# Patient Record
Sex: FEMALE | Race: BLACK OR AFRICAN AMERICAN | Employment: FULL TIME | URBAN - METROPOLITAN AREA
[De-identification: names, ages, dates, MRNs, and addresses within clinical notes are randomized per-mention and may not be internally consistent; named-entity substitution may affect disease eponyms.]

---

## 2019-02-26 ENCOUNTER — OFFICE VISIT (OUTPATIENT)
Dept: FAMILY MEDICINE CLINIC | Age: 49
End: 2019-02-26

## 2019-02-26 VITALS
BODY MASS INDEX: 23.49 KG/M2 | SYSTOLIC BLOOD PRESSURE: 102 MMHG | WEIGHT: 141 LBS | DIASTOLIC BLOOD PRESSURE: 62 MMHG | HEIGHT: 65 IN | TEMPERATURE: 98.5 F | OXYGEN SATURATION: 99 % | RESPIRATION RATE: 16 BRPM | HEART RATE: 71 BPM

## 2019-02-26 DIAGNOSIS — N93.9 ABNORMAL UTERINE BLEEDING: Primary | ICD-10-CM

## 2019-02-26 DIAGNOSIS — Z83.3 FAMILY HISTORY OF DIABETES MELLITUS: ICD-10-CM

## 2019-02-26 DIAGNOSIS — R52 ACHES: ICD-10-CM

## 2019-02-26 DIAGNOSIS — Z76.89 ESTABLISHING CARE WITH NEW DOCTOR, ENCOUNTER FOR: ICD-10-CM

## 2019-02-26 DIAGNOSIS — G89.29 CHRONIC PAIN OF BOTH KNEES: ICD-10-CM

## 2019-02-26 DIAGNOSIS — N92.6 ABNORMAL MENSES: ICD-10-CM

## 2019-02-26 DIAGNOSIS — Z29.9 PREVENTIVE MEASURE: ICD-10-CM

## 2019-02-26 DIAGNOSIS — R45.89 FEELING DOWN: ICD-10-CM

## 2019-02-26 DIAGNOSIS — M25.562 CHRONIC PAIN OF BOTH KNEES: ICD-10-CM

## 2019-02-26 DIAGNOSIS — J32.9 RECURRENT SINUSITIS: ICD-10-CM

## 2019-02-26 DIAGNOSIS — M25.561 CHRONIC PAIN OF BOTH KNEES: ICD-10-CM

## 2019-02-26 LAB
BILIRUB UR QL STRIP: NEGATIVE
GLUCOSE UR-MCNC: NEGATIVE MG/DL
HCG URINE, QL. (POC): NEGATIVE
KETONES P FAST UR STRIP-MCNC: NEGATIVE MG/DL
PH UR STRIP: 7 [PH] (ref 4.6–8)
PROT UR QL STRIP: NEGATIVE
SP GR UR STRIP: 1.02 (ref 1–1.03)
UA UROBILINOGEN AMB POC: NORMAL (ref 0.2–1)
URINALYSIS CLARITY POC: CLEAR
URINALYSIS COLOR POC: YELLOW
URINE BLOOD POC: NORMAL
URINE LEUKOCYTES POC: NEGATIVE
URINE NITRITES POC: NEGATIVE
VALID INTERNAL CONTROL?: YES

## 2019-02-26 NOTE — PROGRESS NOTES
Chief Complaint Patient presents with  Labs  
  requesting lab work Blood pressure 102/62, pulse 71, temperature 98.5 °F (36.9 °C), temperature source Oral, resp. rate 16, height 5' 4.76\" (1.645 m), weight 141 lb (64 kg), last menstrual period 02/15/2019, SpO2 99 %. 1. Have you been to the ER, urgent care clinic since your last visit? Hospitalized since your last visit? No 
 
2. Have you seen or consulted any other health care providers outside of the [address] [place] [address] since your last visit? Include any pap smears or colon screening.  No

## 2019-02-26 NOTE — PROGRESS NOTES
HPI  
 
Chief Complaint Patient presents with  Labs  
  requesting lab work She is a 53 y.o. female who presents for labs, establishing care. Establishing Care PMH - uterine fibroids, leiomyoma, ovarian cysts Past Fam Hx - Mom has DM Aches/ Pains - states this happens occasionally, denies trauma 
- started new job recently, is sitting a long and gets pain in her knees 
- states pain improves with exercises, states she has not been able to exercise as much since moving 
- has bought a new cushion for chair at work Feeling down 
- states she feels down in winter time 
- started a new job which is a great source of stress 
- moved to a new neighborhood which she doesn't particularly like 
- states this has been ongoing for years Heavy Menses 
- hx of uterine fibroids 
- has not seen GYN/ had a PAP in years 
- not using birth control or any family planning methods - Mom went into menopause in late 62s 
-  x2 
- some issues with stress incontinence Refuses Flu, Tdap Review of Systems Musculoskeletal: Positive for arthralgias. Psychiatric/Behavioral: Positive for dysphoric mood. Negative for suicidal ideas. Reviewed PmHx, RxHx, FmHx, SocHx, AllgHx and updated and dated in the chart. Physical Exam: 
Visit Vitals /62 Pulse 71 Temp 98.5 °F (36.9 °C) (Oral) Resp 16 Ht 5' 4.76\" (1.645 m) Wt 141 lb (64 kg) LMP 02/15/2019 SpO2 99% BMI 23.63 kg/m² Physical Exam  
Constitutional: She appears well-developed and well-nourished. No distress. Cardiovascular: Normal rate, regular rhythm and normal heart sounds. Exam reveals no gallop and no friction rub. No murmur heard. Pulmonary/Chest: Effort normal and breath sounds normal. No stridor. No respiratory distress. She has no wheezes. She has no rales. Abdominal: Soft. She exhibits no distension and no mass. There is no tenderness. There is no rebound and no guarding. Fundal height below umbilicus Skin: Skin is warm and dry. She is not diaphoretic. Psychiatric: She has a normal mood and affect. Her behavior is normal.  
Nursing note and vitals reviewed. Refused PHQ9/ GAD7 UPT neg 
UA trace blood, no LE/ nitrites/ protein No results found for this or any previous visit (from the past 12 hour(s)). Assessment / Plan Establishing Care - Patient needs to return for full well woman exam to discuss preventive care, PAP Abnormal menses - UPT neg 
- CBC, PTT, PT/ INR, TSH 
- Transvaginal/ transabdominal US 
- Recommend endometrial bx, recommend patient get US as soon as possible to rule out other cases including fibroids that may need referral to [address] [place] [address] returning for PAP as soon as possible Recurrent Sinusitis - Patient request referral to ENT to follow up, states she has seen them previously Preventive Labs/ Family Hx of DM 
- HgA1c, lipid panel, CBC, CMP 
- Mammo needed BL Knee Pain - Patient states pain is directly related to sedentary lifestyle - States pain feels better when she walks around - Recommend new chair/ seat cushion - Set phone hourly and walk during work for 5-10 min at a time if possible - RTC if pain does not improve/ worsen Feeling Down - Refused PHQ9/ GAD7 and to discuss much further including counseling/ medications, patient states she does not want to be \"labeled\" - Vitamin D, TSH Follow up as soon as possible for PAP I have discussed the diagnosis with the patient and the intended plan as seen in the above orders. The patient has received an after-visit summary and questions were answered concerning future plans. I have discussed medication side effects and warnings with the patient as well. Patient discussed with Dr. Lauren Flynn (Attending) Marden Hatchet, DO Family Medicine Resident

## 2019-02-26 NOTE — PATIENT INSTRUCTIONS
If you have not received a phone call within 24 hours regarding scheduling your diagnostic imaging please call central scheduling at [phone]. [facility] Dairl MD Jorje and Samir Villarreal MD 
 
[place] Office: 
[address] [place], [address] 
[phone] [place] Office: 
[address] [place], [address] [facility] 
[phone] 
  
Abnormal Uterine Bleeding: Care Instructions Your Care Instructions Abnormal uterine bleeding (AUB) is irregular bleeding from the uterus that is longer or heavier than usual or does not occur at your regular time. Sometimes it is caused by changes in hormone levels. It can also be caused by growths in the uterus, such as fibroids or polyps. Sometimes a cause cannot be found. You may have heavy bleeding when you are not expecting your period. Your doctor may suggest a pregnancy test, if you think you are pregnant. Follow-up care is a key part of your treatment and safety. Be sure to make and go to all appointments, and call your doctor if you are having problems. It's also a good idea to know your test results and keep a list of the medicines you take. How can you care for yourself at home? · Be safe with medicines. Take pain medicines exactly as directed. ? If the doctor gave you a prescription medicine for pain, take it as prescribed. ? If you are not taking a prescription pain medicine, ask your doctor if you can take an over-the-counter medicine. · You may be low in iron because of blood loss. Eat a balanced diet that is high in iron and vitamin C. Foods rich in iron include red meat, shellfish, eggs, beans, and leafy green vegetables. Talk to your doctor about whether you need to take iron pills or a multivitamin. When should you call for help? Call 911 anytime you think you may need emergency care. For example, call if: 
  · You passed out (lost consciousness).  Call your doctor now or seek immediate medical care if: 
  · You have new or worse belly or pelvic pain.  
  · You have severe vaginal bleeding.  
  · You feel dizzy or lightheaded, or you feel like you may faint.  
 Watch closely for changes in your health, and be sure to contact your doctor if: 
  · You think you may be pregnant.  
  · Your bleeding gets worse.  
  · You do not get better as expected. Where can you learn more? Go to http://ирина-neida.info/. Enter S180 in the search box to learn more about \"Abnormal Uterine Bleeding: Care Instructions. \" Current as of: May 14, 2018 Content Version: 11.9 © 1976-7337 [employer], [employer]. Care instructions adapted under license by [employer] (which disclaims liability or warranty for this information).  If you have questions about a medical condition or this instruction, always ask your healthcare professional. [address] any warranty or liability for your use of this information.

## 2019-02-26 NOTE — PROGRESS NOTES
53year old female here to re-establish care No PAP smear since 2014 Hx uterine fibroids Has stress incontinence at times Will do labs today and followup for PAP smear I reviewed with the resident the medical history and the resident's findings on the physical examination. I discussed with the resident the patient's diagnosis and concur with the plan.

## 2019-02-27 LAB
25(OH)D3+25(OH)D2 SERPL-MCNC: 18.3 NG/ML (ref 30–100)
ALBUMIN SERPL-MCNC: 4.7 G/DL (ref 3.5–5.5)
ALBUMIN/GLOB SERPL: 1.5 {RATIO} (ref 1.2–2.2)
ALP SERPL-CCNC: 81 IU/L (ref 39–117)
ALT SERPL-CCNC: 17 IU/L (ref 0–32)
APTT PPP: 28 SEC (ref 24–33)
AST SERPL-CCNC: 23 IU/L (ref 0–40)
BILIRUB SERPL-MCNC: 0.4 MG/DL (ref 0–1.2)
BUN SERPL-MCNC: 10 MG/DL (ref 6–24)
BUN/CREAT SERPL: 14 (ref 9–23)
CALCIUM SERPL-MCNC: 9.2 MG/DL (ref 8.7–10.2)
CHLORIDE SERPL-SCNC: 103 MMOL/L (ref 96–106)
CHOLEST SERPL-MCNC: 166 MG/DL (ref 100–199)
CO2 SERPL-SCNC: 21 MMOL/L (ref 20–29)
CREAT SERPL-MCNC: 0.74 MG/DL (ref 0.57–1)
ERYTHROCYTE [DISTWIDTH] IN BLOOD BY AUTOMATED COUNT: 17.9 % (ref 12.3–15.4)
EST. AVERAGE GLUCOSE BLD GHB EST-MCNC: 111 MG/DL
GLOBULIN SER CALC-MCNC: 3.2 G/DL (ref 1.5–4.5)
GLUCOSE SERPL-MCNC: 84 MG/DL (ref 65–99)
HBA1C MFR BLD: 5.5 % (ref 4.8–5.6)
HCT VFR BLD AUTO: 30.9 % (ref 34–46.6)
HDLC SERPL-MCNC: 50 MG/DL
HGB BLD-MCNC: 9 G/DL (ref 11.1–15.9)
INR PPP: 1 (ref 0.8–1.2)
INTERPRETATION, 910389: NORMAL
LDLC SERPL CALC-MCNC: 98 MG/DL (ref 0–99)
MCH RBC QN AUTO: 21.2 PG (ref 26.6–33)
MCHC RBC AUTO-ENTMCNC: 29.1 G/DL (ref 31.5–35.7)
MCV RBC AUTO: 73 FL (ref 79–97)
PLATELET # BLD AUTO: 391 X10E3/UL (ref 150–379)
POTASSIUM SERPL-SCNC: 4.5 MMOL/L (ref 3.5–5.2)
PROT SERPL-MCNC: 7.9 G/DL (ref 6–8.5)
PROTHROMBIN TIME: 10.6 SEC (ref 9.1–12)
RBC # BLD AUTO: 4.24 X10E6/UL (ref 3.77–5.28)
SODIUM SERPL-SCNC: 140 MMOL/L (ref 134–144)
TRIGL SERPL-MCNC: 88 MG/DL (ref 0–149)
TSH SERPL DL<=0.005 MIU/L-ACNC: 2.25 UIU/ML (ref 0.45–4.5)
VLDLC SERPL CALC-MCNC: 18 MG/DL (ref 5–40)
WBC # BLD AUTO: 4.2 X10E3/UL (ref 3.4–10.8)

## 2019-03-06 ENCOUNTER — TELEPHONE (OUTPATIENT)
Dept: FAMILY MEDICINE CLINIC | Age: 49
End: 2019-03-06

## 2019-03-07 NOTE — TELEPHONE ENCOUNTER
----- Message from Jhonny Nash sent at 3/6/2019  4:30 PM EST -----  Regarding: Dr. Donte Blunt   Pt is requesting a call back regarding her lab results. Best contact is [phone].

## 2019-03-08 ENCOUNTER — TELEPHONE (OUTPATIENT)
Dept: OBGYN | Age: 49
End: 2019-03-08

## 2019-03-08 PROBLEM — D50.0 IRON DEFICIENCY ANEMIA DUE TO CHRONIC BLOOD LOSS: Status: ACTIVE | Noted: 2019-03-08

## 2019-03-08 PROBLEM — N93.9 ABNORMAL UTERINE BLEEDING (AUB): Status: ACTIVE | Noted: 2019-03-08

## 2019-03-08 PROBLEM — D75.839 THROMBOCYTOSIS: Status: ACTIVE | Noted: 2019-03-08

## 2019-03-08 RX ORDER — LANOLIN ALCOHOL/MO/W.PET/CERES
325 CREAM (GRAM) TOPICAL 2 TIMES DAILY
Qty: 60 TAB | Refills: 1 | Status: SHIPPED | OUTPATIENT
Start: 2019-03-08

## 2019-03-08 NOTE — TELEPHONE ENCOUNTER
3/8/2019 2:40 PM    Hg now 9 (previously 10.6 in 2014). PLT count minimally elevated likely 2/2 iron deficiency anemia. Patient will schedule US next week states she is on cycle now. After US returns will get PAP/ endometrial bx if normal. Sent Iron 325mg BID to pharmacy. Vitamin D also low states she wants something natural or OTC. Recommend Vitamin D3 2000 units daily.     Dawna Rodriguez DO

## 2019-03-08 NOTE — TELEPHONE ENCOUNTER
3/8/2019 11:12 AM    Attempted to call patient back to discuss labs. Did not answer. Left VM to call back.     Penni Oppenheim, DO

## 2019-03-15 ENCOUNTER — HOSPITAL ENCOUNTER (OUTPATIENT)
Dept: ULTRASOUND IMAGING | Age: 49
Discharge: HOME | End: 2019-03-15
Attending: FAMILY MEDICINE
Payer: COMMERCIAL

## 2019-03-15 DIAGNOSIS — N92.6 ABNORMAL MENSES: ICD-10-CM

## 2019-03-15 DIAGNOSIS — N93.9 ABNORMAL UTERINE BLEEDING: ICD-10-CM

## 2019-03-15 PROCEDURE — 76830 TRANSVAGINAL US NON-OB: CPT

## 2019-03-15 PROCEDURE — 76856 US EXAM PELVIC COMPLETE: CPT

## 2019-03-19 ENCOUNTER — TELEPHONE (OUTPATIENT)
Dept: FAMILY MEDICINE CLINIC | Age: 49
End: 2019-03-19

## 2019-03-19 DIAGNOSIS — D50.9 IRON DEFICIENCY ANEMIA, UNSPECIFIED IRON DEFICIENCY ANEMIA TYPE: ICD-10-CM

## 2019-03-19 DIAGNOSIS — N92.4 EXCESSIVE BLEEDING IN PREMENOPAUSAL PERIOD: ICD-10-CM

## 2019-03-19 DIAGNOSIS — D21.9 LEIOMYOMA: Primary | ICD-10-CM

## 2019-03-19 NOTE — TELEPHONE ENCOUNTER
3/19/2019 8:51 AM    Called patient to discuss recent imaging results. Her US showed L ovarian cyst and multiple leiomyomata's and increased uterine size likely responsible for heavy bleeding and anemia. Patient has stated before she does not want birth control options. Recommend she be evaluated by OB/ GYN for further management. Gave her the number for Dr. Lillian Gregorio. States she will call to arrange appt. Recommend she follow up 4/1 for PAP.      Elo Morrison, DO

## 2019-03-19 NOTE — PROGRESS NOTES
Multiple leiomyomas and increased uterine size. Also L ovarian cyst noted. Has been referred to OB/ GYN. Patient aware of results.

## 2019-07-24 NOTE — PROGRESS NOTES
Chief Complaint: complete physical exam with PAP  Source: self    HPI:  Ruth Pressley is a 53 y.o. female presenting for well woman exam.     Has constipation with menstrual periods. Feels like this is related to her fibroids     Has significant stress with work  Attendance secretary at a high school  Has a close [place] of friends who she goes to for support    Age at which menses began: 10years  Last menstrual period was 20  Length of periods: 6-8 days  Number of days between periods: 28-30 days   Menstrual flow: in between moderate and heavy periods, sometimes spots for a week prior to her period  Recent US demonstrated multiple fibroids and patient has hx of heavy menstrual bleeding    ,  x2, last pregnancy 17 years ago    Sexually active?: no  Method of family planning: none, previously OCPs until desired pregnancy     Diet: healthy - lots of fruits, vegetables, no dairy/meats, plant-based, no eggs    Exercise: works out daily with walk, run or other aerobic exercise      Allergies- reviewed: Allergies   Allergen Reactions    Monistat 1 [Tioconazole] Itching         Medications- reviewed:   Current Outpatient Medications   Medication Sig    cholecalciferol, vitamin D3, (VITAMIN D3) 2,000 unit tab Take  by mouth.  ferrous sulfate 325 mg (65 mg iron) tablet Take 1 Tab by mouth two (2) times a day. No current facility-administered medications for this visit.           Past Medical History- reviewed:  Past Medical History:   Diagnosis Date    Genital warts          Past Surgical History- reviewed:   Past Surgical History:   Procedure Laterality Date    HX BREAST LUMPECTOMY Left          Family History - reviewed:  Family History   Problem Relation Age of Onset    Diabetes Mother          Social History - reviewed:  Social History     Socioeconomic History    Marital status:      Spouse name: Not on file    Number of children: Not on file    Years of education: Not on file  Highest education level: Not on file   Occupational History    Not on file   Social Needs    Financial resource strain: Not on file    Food insecurity:     Worry: Not on file     Inability: Not on file    Transportation needs:     Medical: Not on file     Non-medical: Not on file   Tobacco Use    Smoking status: Never Smoker    Smokeless tobacco: Never Used   Substance and Sexual Activity    Alcohol use: No    Drug use: No    Sexual activity: Yes     Partners: Male     Birth control/protection: None   Lifestyle    Physical activity:     Days per week: Not on file     Minutes per session: Not on file    Stress: Not on file   Relationships    Social connections:     Talks on phone: Not on file     Gets together: Not on file     Attends [employer] service: Not on file     Active member of club or organization: Not on file     Attends meetings of clubs or organizations: Not on file     Relationship status: Not on file    Intimate partner violence:     Fear of current or ex partner: Not on file     Emotionally abused: Not on file     Physically abused: Not on file     Forced sexual activity: Not on file   Other Topics Concern    Not on file   Social History Narrative    Not on file         Immunizations - reviewed:   Immunization History   Administered Date(s) Administered    TB Skin Test (PPD) Intradermal 06/19/2013     Flu: due fall 2019  Tdap: due today, patient refused  Pneumovax: not indicated   Zostervax: not indicated       Health Maintenance reviewed -  Pap smear today  Mammogram discussed, has active order but declines today  Colonoscopy not indicated   DEXA scan not indicated   HIV testing neg 2012  Hepatitis C testing not indicated   Lung cancer screening not indicated     Review of Systems   Constitutional: Negative for chills and weight loss. HENT: Negative for congestion and sinus pain. Eyes: Negative for blurred vision. Cardiovascular: Negative for chest pain and palpitations. Gastrointestinal: Positive for abdominal pain and constipation. Negative for blood in stool, diarrhea and vomiting. Genitourinary: Negative for dysuria, frequency and urgency. Musculoskeletal: Negative for myalgias and neck pain. Skin: Negative for itching and rash. Neurological: Negative for dizziness and headaches. Psychiatric/Behavioral: Negative for depression. The patient is not nervous/anxious. BREASTS: No masses or nipple discharge      Physical Exam  Visit Vitals  BP 94/59 (BP 1 Location: Left arm, BP Patient Position: Sitting)   Pulse 71   Temp 98 °F (36.7 °C) (Oral)   Resp 16   Ht 5' 4.76\" (1.645 m)   Wt 138 lb (62.6 kg)   LMP 07/12/2019 (Approximate)   SpO2 100%   BMI 23.13 kg/m²       General appearance - alert, well appearing, and in no distress, oriented to person, place, and time and normal appearing weight  Eyes - pupils equal and reactive, extraocular eye movements intact  Nose - normal and patent, no erythema, discharge or polyps  Mouth - mucous membranes moist, pharynx normal without lesions  Neck - supple, no significant adenopathy, thyroid exam: thyroid is normal in size without nodules or tenderness  Chest - clear to auscultation, no wheezes, rales or rhonchi, symmetric air entry  Heart - normal rate, regular rhythm, normal S1, S2, no murmurs, rubs, clicks or gallops  Abdomen - soft, nontender, nondistended, no masses or organomegaly  bowel sounds normal  Neurological - alert, oriented, normal speech, no focal findings or movement disorder noted  Musculoskeletal - no joint tenderness, deformity or swelling, full range of motion without pain  Extremities - peripheral pulses normal, no pedal edema, no clubbing or cyanosis; bruising of distal aspect of Right great toe with bruising at the nail bed. Nonerythematous, nontender, full ROM  Skin - normal coloration and turgor, no rashes, no suspicious skin lesions noted  Pelvic - Exam chaperoned by Tevin Dockery LPN.  External genitalia normal without rashes or lesions. Pink and moist vaginal mucosa. Scant white discharge. Cervix without lesions or abnormal discharge. Uterus non tender and with enlargement to 10-12wk size on left, regular contour on palpation. . No adnexal masses or tenderness. Mammogram ordered today. Breast exam deferred. The American Cancer Society recommends not performing clinical breast examination, given the potential for false-positive findings and lack of evidence for improved outcomes. The USPSTF states that evidence is insufficient to assess additional benefits of clinical breast examination beyond mammography. Assessment/Plan:    ICD-10-CM ICD-9-CM    1. Pap smear for cervical cancer screening Z12.4 V76.2 PAP IG, APTIMA HPV AND RFX 16/18,45 ([id number])      CANCELED: PAP IG, APTIMA HPV AND RFX 16/18,45 ([id number])   2. Encounter for preventive care Z00.00 V70.0 CBC W/O DIFF      LIPID PANEL      METABOLIC PANEL, COMPREHENSIVE      PAP IG, APTIMA HPV AND RFX 16/18,45 ([id number])   3. Iron deficiency anemia due to chronic blood loss D50.0 280.0    4. Immunization refused Z28.21 V64.06        · Counseled re: diet, exercise, healthy lifestyle  · Discussed importance of annual mammogram with patient. She has no record of mammogram in our system. Patient elects to \"think about it\" but declines mammogram today  · Anemia: taking liquid iron supplement, recheck HgB today  · Hx of heavy menses: improved since February per patient and declines interference with daily life. PAP today. · Refused Tdap today  · Appropriate labs, vaccines, imaging studies, and referrals ordered as listed above    I have discussed the diagnosis with the patient and the intended plan as seen in the above orders. Patient verbalized understanding of the plan and agrees with the plan. The patient has received an after-visit summary and questions were answered concerning future plans.   I have discussed medication side effects and warnings with the patient as well. Informed patient to return to the office if new symptoms arise. Patient discussed with Dr. Sigifredo Prieto, supervising physician.     Lonnie Wiley MD  Family Medicine Resident

## 2019-07-25 ENCOUNTER — OFFICE VISIT (OUTPATIENT)
Dept: FAMILY MEDICINE CLINIC | Age: 49
End: 2019-07-25

## 2019-07-25 VITALS
OXYGEN SATURATION: 100 % | HEIGHT: 65 IN | TEMPERATURE: 98 F | RESPIRATION RATE: 16 BRPM | DIASTOLIC BLOOD PRESSURE: 59 MMHG | SYSTOLIC BLOOD PRESSURE: 94 MMHG | BODY MASS INDEX: 22.99 KG/M2 | HEART RATE: 71 BPM | WEIGHT: 138 LBS

## 2019-07-25 DIAGNOSIS — D50.0 IRON DEFICIENCY ANEMIA DUE TO CHRONIC BLOOD LOSS: ICD-10-CM

## 2019-07-25 DIAGNOSIS — Z00.00 ENCOUNTER FOR PREVENTIVE CARE: Primary | ICD-10-CM

## 2019-07-25 DIAGNOSIS — Z12.4 PAP SMEAR FOR CERVICAL CANCER SCREENING: ICD-10-CM

## 2019-07-25 DIAGNOSIS — Z28.21 IMMUNIZATION REFUSED: ICD-10-CM

## 2019-07-25 RX ORDER — CHOLECALCIFEROL (VITAMIN D3) 125 MCG
CAPSULE ORAL
COMMUNITY

## 2019-07-25 NOTE — PATIENT INSTRUCTIONS
Dr. Lisa Anne, Dr Brittany Thomas, Dr Jorge Haas - if you want an evaluation for vaginal surgery  [address]  [address]   [address]     Pap Test: Care Instructions  Your Care Instructions    The Pap test (also called a Pap smear) is a screening test for cancer of the cervix, which is the lower part of the uterus that opens into the vagina. The test can help your doctor find early changes in the cells that could lead to cancer. The sample of cells taken during your test has been sent to a lab so that an expert can look at the cells. It usually takes a week or two to get the results back. Follow-up care is a key part of your treatment and safety. Be sure to make and go to all appointments, and call your doctor if you are having problems. It's also a good idea to know your test results and keep a list of the medicines you take. What do the results mean? · A normal result means that the test did not find any abnormal cells in the sample. · An abnormal result can mean many things. Most of these are not cancer. The results of your test may be abnormal because:  ? You have an infection of the vagina or cervix, such as a yeast infection. ? You have an IUD (intrauterine device for birth control). ? You have low estrogen levels after menopause that are causing the cells to change. ? You have cell changes that may be a sign of precancer or cancer. The results are ranked based on how serious the changes might be. There are many other reasons why you might not get a normal result. If the results were abnormal, you may need to get another test within a few weeks or months. If the results show changes that could be a sign of cancer, you may need a test called a colposcopy, which provides a more complete view of the cervix. Sometimes the lab cannot use the sample because it does not contain enough cells or was not preserved well.  If so, you may need to have the test again. This is not common, but it does happen from time to time. When should you call for help? Watch closely for changes in your health, and be sure to contact your doctor if:    · You have vaginal bleeding or pain for more than 2 days after the test. It is normal to have a small amount of bleeding for a day or two after the test.   Where can you learn more? Go to http://ирина-neida.info/. Enter M849 in the search box to learn more about \"Pap Test: Care Instructions. \"  Current as of: December 19, 2018  Content Version: 12.1  © 0349-8931 [employer]. Care instructions adapted under license by [employer] (which disclaims liability or warranty for this information). If you have questions about a medical condition or this instruction, always ask your healthcare professional. [address] any warranty or liability for your use of this information.

## 2019-07-25 NOTE — PROGRESS NOTES
Chief Complaint   Patient presents with    Complete Physical     1. Have you been to the ER, urgent care clinic since your last visit? Hospitalized since your last visit? No    2. Have you seen or consulted any other health care providers outside of the [address] [place] [address] since your last visit? Include any pap smears or colon screening.  No  Visit Vitals  BP 94/59 (BP 1 Location: Left arm, BP Patient Position: Sitting)   Pulse 71   Temp 98 °F (36.7 °C) (Oral)   Resp 16   Ht 5' 4.76\" (1.645 m)   Wt 138 lb (62.6 kg)   SpO2 100%   BMI 23.13 kg/m²     Health Maintenance Due   Topic Date Due    DTaP/Tdap/Td series (1 - Tdap) 12/22/1992    PAP AKA CERVICAL CYTOLOGY  08/25/2017

## 2019-07-25 NOTE — PROGRESS NOTES
53year old female here for complete physical    Declined TDAP    Has not had a mammogram (undecided if she will get one)    Labs done today    I reviewed with the resident the medical history and the resident's findings on the physical examination. I discussed with the resident the patient's diagnosis and concur with the plan.

## 2019-07-26 LAB
ALBUMIN SERPL-MCNC: 4.5 G/DL (ref 3.5–5.5)
ALBUMIN/GLOB SERPL: 1.6 {RATIO} (ref 1.2–2.2)
ALP SERPL-CCNC: 82 IU/L (ref 39–117)
ALT SERPL-CCNC: 14 IU/L (ref 0–32)
AST SERPL-CCNC: 22 IU/L (ref 0–40)
BILIRUB SERPL-MCNC: 0.4 MG/DL (ref 0–1.2)
BUN SERPL-MCNC: 11 MG/DL (ref 6–24)
BUN/CREAT SERPL: 17 (ref 9–23)
CALCIUM SERPL-MCNC: 9 MG/DL (ref 8.7–10.2)
CHLORIDE SERPL-SCNC: 103 MMOL/L (ref 96–106)
CHOLEST SERPL-MCNC: 163 MG/DL (ref 100–199)
CO2 SERPL-SCNC: 23 MMOL/L (ref 20–29)
CREAT SERPL-MCNC: 0.66 MG/DL (ref 0.57–1)
ERYTHROCYTE [DISTWIDTH] IN BLOOD BY AUTOMATED COUNT: 15.3 % (ref 12.3–15.4)
GLOBULIN SER CALC-MCNC: 2.9 G/DL (ref 1.5–4.5)
GLUCOSE SERPL-MCNC: 95 MG/DL (ref 65–99)
HCT VFR BLD AUTO: 33.3 % (ref 34–46.6)
HDLC SERPL-MCNC: 51 MG/DL
HGB BLD-MCNC: 9.7 G/DL (ref 11.1–15.9)
INTERPRETATION, 910389: NORMAL
LDLC SERPL CALC-MCNC: 84 MG/DL (ref 0–99)
MCH RBC QN AUTO: 24.1 PG (ref 26.6–33)
MCHC RBC AUTO-ENTMCNC: 29.1 G/DL (ref 31.5–35.7)
MCV RBC AUTO: 83 FL (ref 79–97)
PLATELET # BLD AUTO: 286 X10E3/UL (ref 150–450)
POTASSIUM SERPL-SCNC: 4.3 MMOL/L (ref 3.5–5.2)
PROT SERPL-MCNC: 7.4 G/DL (ref 6–8.5)
RBC # BLD AUTO: 4.02 X10E6/UL (ref 3.77–5.28)
SODIUM SERPL-SCNC: 140 MMOL/L (ref 134–144)
TRIGL SERPL-MCNC: 140 MG/DL (ref 0–149)
VLDLC SERPL CALC-MCNC: 28 MG/DL (ref 5–40)
WBC # BLD AUTO: 3.9 X10E3/UL (ref 3.4–10.8)

## 2019-07-29 LAB
CYTOLOGIST CVX/VAG CYTO: NORMAL
CYTOLOGY CVX/VAG DOC CYTO: NORMAL
CYTOLOGY CVX/VAG DOC THIN PREP: NORMAL
DX ICD CODE: NORMAL
HPV I/H RISK 4 DNA CVX QL PROBE+SIG AMP: NEGATIVE
Lab: NORMAL
OTHER STN SPEC: NORMAL
STAT OF ADQ CVX/VAG CYTO-IMP: NORMAL

## 2021-07-27 ENCOUNTER — TELEPHONE (OUTPATIENT)
Dept: FAMILY MEDICINE CLINIC | Age: 51
End: 2021-07-27

## 2021-07-27 NOTE — TELEPHONE ENCOUNTER
----- Message from Nicole Andrea sent at 7/27/2021  8:49 AM EDT -----  Regarding: Dr. Dari Johnson first and last name: self      Reason for call:Pt advised wanting blood work that check her levels of iron, vitamin D, cancer cells and wanting to check if she's starting menopause . Callback required yes/no and why:yes, to clarify       Best contact number(s):[phone]      Details to clarify the request:Pt advised has a physical schedule on 08.06.21 and wants to get blood work on the same day. Pt advised wanting to get a referral for an Allergist and Otorhinolaryngology.        Nicole Andrea

## 2021-08-06 ENCOUNTER — TRANSCRIBE ORDER (OUTPATIENT)
Dept: SCHEDULING | Age: 51
End: 2021-08-06

## 2021-08-06 ENCOUNTER — OFFICE VISIT (OUTPATIENT)
Dept: FAMILY MEDICINE CLINIC | Age: 51
End: 2021-08-06
Payer: COMMERCIAL

## 2021-08-06 VITALS
BODY MASS INDEX: 21.99 KG/M2 | WEIGHT: 132 LBS | HEIGHT: 65 IN | DIASTOLIC BLOOD PRESSURE: 70 MMHG | SYSTOLIC BLOOD PRESSURE: 110 MMHG | RESPIRATION RATE: 17 BRPM | HEART RATE: 61 BPM | OXYGEN SATURATION: 99 % | TEMPERATURE: 97.8 F

## 2021-08-06 DIAGNOSIS — R10.2 PELVIC PAIN: Primary | ICD-10-CM

## 2021-08-06 DIAGNOSIS — R10.2 PELVIC PAIN IN FEMALE: Primary | ICD-10-CM

## 2021-08-06 DIAGNOSIS — R10.2 PELVIC PAIN: ICD-10-CM

## 2021-08-06 DIAGNOSIS — R31.9 HEMATURIA, UNSPECIFIED TYPE: ICD-10-CM

## 2021-08-06 DIAGNOSIS — N93.9 ABNORMAL UTERINE BLEEDING (AUB): ICD-10-CM

## 2021-08-06 DIAGNOSIS — D21.9 LEIOMYOMA: ICD-10-CM

## 2021-08-06 DIAGNOSIS — D50.0 IRON DEFICIENCY ANEMIA DUE TO CHRONIC BLOOD LOSS: ICD-10-CM

## 2021-08-06 DIAGNOSIS — R30.0 DYSURIA: ICD-10-CM

## 2021-08-06 LAB
BILIRUB UR QL STRIP: NEGATIVE
GLUCOSE UR-MCNC: NEGATIVE MG/DL
HCG URINE, QL. (POC): NEGATIVE
HGB BLD-MCNC: 8.2 G/DL
KETONES P FAST UR STRIP-MCNC: NEGATIVE MG/DL
PH UR STRIP: 8 [PH] (ref 4.6–8)
PROT UR QL STRIP: NORMAL
SP GR UR STRIP: 1.02 (ref 1–1.03)
UA UROBILINOGEN AMB POC: NORMAL (ref 0.2–1)
URINALYSIS CLARITY POC: NORMAL
URINALYSIS COLOR POC: YELLOW
URINE BLOOD POC: NORMAL
URINE LEUKOCYTES POC: NEGATIVE
URINE NITRITES POC: NEGATIVE
VALID INTERNAL CONTROL?: YES
WET MOUNT POCT, WMPOCT: NORMAL

## 2021-08-06 PROCEDURE — 99214 OFFICE O/P EST MOD 30 MIN: CPT | Performed by: STUDENT IN AN ORGANIZED HEALTH CARE EDUCATION/TRAINING PROGRAM

## 2021-08-06 PROCEDURE — 87210 SMEAR WET MOUNT SALINE/INK: CPT | Performed by: STUDENT IN AN ORGANIZED HEALTH CARE EDUCATION/TRAINING PROGRAM

## 2021-08-06 PROCEDURE — 81025 URINE PREGNANCY TEST: CPT | Performed by: STUDENT IN AN ORGANIZED HEALTH CARE EDUCATION/TRAINING PROGRAM

## 2021-08-06 PROCEDURE — 81003 URINALYSIS AUTO W/O SCOPE: CPT | Performed by: STUDENT IN AN ORGANIZED HEALTH CARE EDUCATION/TRAINING PROGRAM

## 2021-08-06 PROCEDURE — 85018 HEMOGLOBIN: CPT | Performed by: STUDENT IN AN ORGANIZED HEALTH CARE EDUCATION/TRAINING PROGRAM

## 2021-08-06 NOTE — ASSESSMENT & PLAN NOTE
2/2 uterine fibroids and irregular menses  Restart Iron  POC Hgb 8.2  Follow up in 1 week repeat Hgb on follow up

## 2021-08-06 NOTE — PROGRESS NOTES
Chief Complaint   Patient presents with    Irregular Menses     times 4 months    Referral Request     ENT    Dysuria     1. Have you been to the ER, urgent care clinic since your last visit? Hospitalized since your last visit? No    2. Have you seen or consulted any other health care providers outside of the [address] [place] [address] since your last visit? Include any pap smears or colon screening.  No

## 2021-08-06 NOTE — PATIENT INSTRUCTIONS
Imaging Scheduling: # [phone]    Dr. Westley Olivas, [facility]  [id number] [place] [address]  [address]  [place], [address] [facility]  [phone]  [phone]     Uterine Fibroids: Care Instructions  Overview     Uterine fibroids are growths in the uterus. Fibroids aren't cancer. Doctors don't know what causes fibroids. Fibroids are very common in women during their childbearing years. Fibroids can grow on the inside of the uterus, in the muscle wall of the uterus, or near the outside wall of the uterus. In some women, fibroids cause painful cramps and heavy periods. In these cases, an intrauterine device (IUD) can help decrease symptoms. It can also help to take anti-inflammatory medicines or birth control pills. Sometimes surgery is needed to treat fibroids. But if you are near menopause, you may want to wait and see if your symptoms get better. Most fibroids shrink and go away after menopause. This occurs when your periods stop completely. Follow-up care is a key part of your treatment and safety. Be sure to make and go to all appointments, and call your doctor if you are having problems. It's also a good idea to know your test results and keep a list of the medicines you take. How can you care for yourself at home? · If your doctor gave you medicine, take it as exactly as prescribed. Be safe with medicines. Call your doctor if you think you are having a problem with your medicine. · Take anti-inflammatory medicines for pain. These include ibuprofen and naproxen. Read and follow all instructions on the label. · Use heat, such as a hot water bottle or a heating pad set on low, or a warm bath to relax tense muscles and relieve cramping. Put a thin cloth between the heating pad and your skin. Never go to sleep with a heating pad on. · Lie down and put a pillow under your knees. Or lie on your side and bring your knees up to your chest. These positions may help relieve belly pain or pressure.   · Keep track of how many sanitary pads or tampons you use each day. · Get at least 30 minutes of exercise on most days of the week. Walking is a good choice. You also may want to do other activities, such as running, swimming, cycling, or playing tennis or team sports. · If you bleed longer than usual or have heavy bleeding, take a daily multivitamin with iron. When should you call for help? Call your doctor now or seek immediate medical care if:    · You have severe vaginal bleeding.     · You have new or worse belly or pelvic pain. Watch closely for changes in your health, and be sure to contact your doctor if:    · You have unusual vaginal bleeding.     · You do not get better as expected. Where can you learn more? Go to http://www.gray.com/  Enter B121 in the search box to learn more about \"Uterine Fibroids: Care Instructions. \"  Current as of: July 17, 2020               Content Version: 12.8  © 2779-0190 to be. Care instructions adapted under license by [employer] (which disclaims liability or warranty for this information). If you have questions about a medical condition or this instruction, always ask your healthcare professional. [address] any warranty or liability for your use of this information.

## 2021-08-06 NOTE — PROGRESS NOTES
[address] [facility] [address] [address]   Office [phone], Fax [phone]    Subjective:     Chief Complaint   Patient presents with    Irregular Menses     times 4 months    Referral Request     ENT    Dysuria       HPI:  Conor Calvin is a 53 y.o. female that presents for:    Lower Pelvic Pain/Pressure  Reports bladder hurts only when she urinates and also feels pressure since the last Wednesday  Has had this issue chronically that was previously a/w constipation  Feels like the pressure is worsening  Also reports h/o of uterine fibroids, referred to OBGYN back in 2019 but did not go  Reports \"feels like bladder doesn't open and close. \"  Originally reports dysuria but on further clarification report no urethral pain just pelvic pain, but was unsure if it was actual pain or pressure  Last Pap 2019 neg   Denies dyspareunia, constipation, vaginal discharge, flank pain, fever/chills, and n/v. Also, reported chronic irregular menstruations past several years, at times at increased intervals or spotting through out month. Reports chronic anemia 2/2 to irregular menstruations  Prescribed iron but noncompliant  Denies fatigue, light headedness, dizziness, CP, and SOB. ROS:   Review of Systems   Constitutional: Negative for chills, fever, malaise/fatigue and weight loss. Eyes: Negative for blurred vision. Respiratory: Negative for shortness of breath. Cardiovascular: Negative for chest pain and palpitations. Gastrointestinal: Negative for abdominal pain, constipation, diarrhea, nausea and vomiting. Genitourinary: Positive for hematuria. Negative for dysuria, flank pain, frequency and urgency. Pelvic Pain   Neurological: Negative for dizziness, weakness and headaches. Past Medical Hx  I personally reviewed. Past Medical History:   Diagnosis Date    Genital warts         SocHx   I personally reviewed.   Social History     Socioeconomic History    Marital status:      Spouse name: Not on file    Number of children: Not on file    Years of education: Not on file    Highest education level: Not on file   Occupational History    Not on file   Tobacco Use    Smoking status: Never Smoker    Smokeless tobacco: Never Used   Substance and Sexual Activity    Alcohol use: No    Drug use: No    Sexual activity: Yes     Partners: Male     Birth control/protection: None   Other Topics Concern    Not on file   Social History Narrative    Not on file     Social Determinants of Health     Financial Resource Strain:     Difficulty of Paying Living Expenses:    Food Insecurity:     Worried About Running Out of Food in the Last Year:     [address] in the Last Year:    Transportation Needs:     Lack of Transportation (Medical):  Lack of Transportation (Non-Medical):    Physical Activity:     Days of Exercise per Week:     Minutes of Exercise per Session:    Stress:     Feeling of Stress :    Social Connections:     Frequency of Communication with Friends and Family:     Frequency of Social Gatherings with Friends and Family:     Attends Hinduism Services:     Active Member of Clubs or Organizations:     Attends Club or Organization Meetings:     Marital Status:    Intimate Partner Violence:     Fear of Current or Ex-Partner:     Emotionally Abused:     Physically Abused:     Sexually Abused: Allergies  I personally reviewed. Allergies   Allergen Reactions    Monistat 1 [Tioconazole] Itching        Medications  I personally reviewed. Current Outpatient Medications on File Prior to Visit   Medication Sig Dispense Refill    multivit-mins no.63/iron/folic (M-VIT PO) Take  by mouth.  cholecalciferol, vitamin D3, (VITAMIN D3) 2,000 unit tab Take  by mouth.  ferrous sulfate 325 mg (65 mg iron) tablet Take 1 Tab by mouth two (2) times a day.  (Patient not taking: Reported on 8/6/2021) 60 Tab 1     No current facility-administered medications on file prior to visit. Objective:   Vitals  I personally reviewed. Visit Vitals  /70   Pulse 61   Temp 97.8 °F (36.6 °C) (Temporal)   Resp 17   Ht 5' 4.75\" (1.645 m)   Wt 132 lb (59.9 kg)   SpO2 99%   BMI 22.14 kg/m²        Physical Exam    Vitals Reviewed. General AO x 3. No distress. Not diaphoretic. No jaundice. No cyanosis. No pallor. Neck No thyromegaly present. No JVD. No cervical adenopathy. Cardio Normal rate, regular rhythm. No murmur, rubs, or gallop. Pulmonary Effort normal. CTAB. No wheezes, rales, or rhonchi. Abdominal Soft. Bowel sounds normal. No tenderness. No masses. No distension. Extremities   No edema of lower extremities. Pulses present. Speculum exam w/o discharge, bleeding, erythema, or lesion. Neurological CN II-XII grossly intact. No focal deficits. Skin No rash. Assessment/Plan:   I personally reviewed the following Pertinent Labs/Studies:   - Imaging 2019      Diagnoses and all orders for this visit:    1. Pelvic pain  Assessment & Plan:  Likely 2/2 uterine fibroids  Transvag US 2019 w/ multiple fibroids, largest 4.0 x 3.1 x 2.9 cm  Repeat US  Will schedule Endometrial Biopsy on follow up visit for Anemia in 1 week  Referred to UROGYN  Given hematuria may also benefit from cystoscopy  UPT today Neg, Wet Prep today unremarkable  G/C pending    Orders:  -     AMB POC URINALYSIS DIP STICK AUTO W/O MICRO  -     Mik Garduno / GC-AMPLIFIED; Future  -     AMB POC URINE PREGNANCY TEST, VISUAL COLOR COMPARISON  -     AMB POC SMEAR, STAIN & INTERPRET, WET MOUNT  -     US PELV NON OBS; Future  -     REFERRAL TO UROGYNECOLOGY    2. Hematuria, unspecified type  -     AMB POC HEMOGLOBIN (HGB)  -     METABOLIC PANEL, BASIC; Future  -     US ABD LTD; Future  -     REFERRAL TO UROGYNECOLOGY    3.  Dysuria  Assessment & Plan:  Patient unsure of \"dysuria\"  A/w 2+ blood on UA, not currently menstruating  No flank pain or fever/chills  No infection on UA today  Get renal and pelvic US  Consider cystoscopy   Follow up G/C    Orders:  -     Jacquiline Flax / Mardene Harbour; Future  -     AMB POC SMEAR, STAIN & INTERPRET, WET MOUNT    4. Iron deficiency anemia due to chronic blood loss  Assessment & Plan:  2/2 uterine fibroids and irregular menses  Restart Iron  POC Hgb 8.2  Follow up in 1 week repeat Hgb on follow up      5. Abnormal uterine bleeding (AUB)  -     REFERRAL TO UROGYNECOLOGY    6. Leiomyoma  -     REFERRAL TO UROGYNECOLOGY         Follow up: Follow-up and Dispositions    · Return in about 2 weeks (around 8/20/2021) for Anemia. Pt was discussed with Dr Claudene Rana (attending physician). I have reviewed patient medical and social history and medications. I have reviewed pertinent labs results and other data. I have discussed the diagnosis with the patient and the intended plan as seen in the above orders. The patient has received an after-visit summary and questions were answered concerning future plans. I have discussed medication side effects and warnings with the patient as well.     Prateek Benavides MD  Resident [facility]  08/06/21

## 2021-08-07 LAB
BUN SERPL-MCNC: 16 MG/DL (ref 6–24)
BUN/CREAT SERPL: 27 (ref 9–23)
CALCIUM SERPL-MCNC: 9.5 MG/DL (ref 8.7–10.2)
CHLORIDE SERPL-SCNC: 105 MMOL/L (ref 96–106)
CO2 SERPL-SCNC: 25 MMOL/L (ref 20–29)
CREAT SERPL-MCNC: 0.6 MG/DL (ref 0.57–1)
GLUCOSE SERPL-MCNC: 88 MG/DL (ref 65–99)
POTASSIUM SERPL-SCNC: 4.5 MMOL/L (ref 3.5–5.2)
SODIUM SERPL-SCNC: 143 MMOL/L (ref 134–144)

## 2021-08-07 NOTE — ASSESSMENT & PLAN NOTE
Patient unsure of \"dysuria\"  A/w 2+ blood on UA, not currently menstruating  No flank pain or fever/chills  No infection on UA today  Get renal and pelvic US  Consider cystoscopy   Follow up G/C

## 2021-08-07 NOTE — ASSESSMENT & PLAN NOTE
Likely 2/2 uterine fibroids  Transvag US 2019 w/ multiple fibroids, largest 4.0 x 3.1 x 2.9 cm  Repeat US  Will schedule Endometrial Biopsy on follow up visit for Anemia in 1 week  Referred to UROGYN  Given hematuria may also benefit from cystoscopy  UPT today Neg, Wet Prep today unremarkable  G/C pending

## 2021-08-08 LAB
C TRACH RRNA SPEC QL NAA+PROBE: NEGATIVE
N GONORRHOEA RRNA SPEC QL NAA+PROBE: NEGATIVE

## 2021-08-09 NOTE — PROGRESS NOTES
G/C Negative
1) NO OROPHARYNGEAL SWALLOWING CONTRAINDICATIONS EVIDENT FOR BEING ON A REGULAR TEXTURE DIET WITH THIN LIQUIDS AS HE TOLERATES THE SAME FROM AN OROPHARYNGEAL SWALLOWING PERSPECTIVE ON EXAM.     2) NO NEED FOR SWALLOWING THERAPY BY A SPEECH PATHOLOGIST.     3) MEDICAL FOLLOW UP AS PER DR FOWLER. NOTE THAT NO OROPHARYNGEAL DYSPHAGIA IDENTIFIED ON EXAM AND HIS PRANDIAL AIRWAY PROTECTION IS NORMAL.     4) RECONSULT PRCHAVA NEVES MA, CCC-SLP

## 2021-08-10 ENCOUNTER — TRANSCRIBE ORDER (OUTPATIENT)
Dept: SCHEDULING | Age: 51
End: 2021-08-10

## 2021-08-10 DIAGNOSIS — R10.2 PELVIC PAIN: Primary | ICD-10-CM

## 2021-08-11 ENCOUNTER — HOSPITAL ENCOUNTER (OUTPATIENT)
Dept: ULTRASOUND IMAGING | Age: 51
Discharge: HOME | End: 2021-08-11
Attending: STUDENT IN AN ORGANIZED HEALTH CARE EDUCATION/TRAINING PROGRAM
Payer: COMMERCIAL

## 2021-08-11 DIAGNOSIS — R10.2 PELVIC PAIN: ICD-10-CM

## 2021-08-11 DIAGNOSIS — R31.9 HEMATURIA, UNSPECIFIED TYPE: ICD-10-CM

## 2021-08-11 PROCEDURE — 76770 US EXAM ABDO BACK WALL COMP: CPT

## 2021-08-11 PROCEDURE — 76830 TRANSVAGINAL US NON-OB: CPT

## 2021-08-11 PROCEDURE — 76856 US EXAM PELVIC COMPLETE: CPT

## 2021-08-20 ENCOUNTER — TELEPHONE (OUTPATIENT)
Dept: FAMILY MEDICINE CLINIC | Age: 51
End: 2021-08-20

## 2021-08-20 ENCOUNTER — LAB ONLY (OUTPATIENT)
Dept: FAMILY MEDICINE CLINIC | Age: 51
End: 2021-08-20

## 2021-08-20 DIAGNOSIS — D50.0 IRON DEFICIENCY ANEMIA DUE TO CHRONIC BLOOD LOSS: ICD-10-CM

## 2021-08-20 DIAGNOSIS — R31.9 HEMATURIA, UNSPECIFIED TYPE: Primary | ICD-10-CM

## 2021-08-20 DIAGNOSIS — R10.2 PELVIC PAIN: ICD-10-CM

## 2021-08-20 NOTE — TELEPHONE ENCOUNTER
Pt came in for her lab appointment today concerned that she has not been contacted about her results from her X-Ray and labs. She mentioned she is concerned about what her next steps are with the results from her test. The pt will like for Dr. Anay Grace to give her a phone call about this matter. Best contact for pt is [phone].

## 2021-08-21 LAB
BASOPHILS # BLD AUTO: 0.1 X10E3/UL (ref 0–0.2)
BASOPHILS NFR BLD AUTO: 1 %
BUN SERPL-MCNC: 11 MG/DL (ref 6–24)
BUN/CREAT SERPL: 15 (ref 9–23)
CALCIUM SERPL-MCNC: 9 MG/DL (ref 8.7–10.2)
CHLORIDE SERPL-SCNC: 107 MMOL/L (ref 96–106)
CO2 SERPL-SCNC: 24 MMOL/L (ref 20–29)
CREAT SERPL-MCNC: 0.71 MG/DL (ref 0.57–1)
EOSINOPHIL # BLD AUTO: 0.1 X10E3/UL (ref 0–0.4)
EOSINOPHIL NFR BLD AUTO: 1 %
ERYTHROCYTE [DISTWIDTH] IN BLOOD BY AUTOMATED COUNT: 22.9 % (ref 11.7–15.4)
GLUCOSE SERPL-MCNC: 96 MG/DL (ref 65–99)
HCT VFR BLD AUTO: 32.7 % (ref 34–46.6)
HGB BLD-MCNC: 8.9 G/DL (ref 11.1–15.9)
IMM GRANULOCYTES # BLD AUTO: 0 X10E3/UL (ref 0–0.1)
IMM GRANULOCYTES NFR BLD AUTO: 0 %
LYMPHOCYTES # BLD AUTO: 1.2 X10E3/UL (ref 0.7–3.1)
LYMPHOCYTES NFR BLD AUTO: 19 %
MCH RBC QN AUTO: 20.8 PG (ref 26.6–33)
MCHC RBC AUTO-ENTMCNC: 27.2 G/DL (ref 31.5–35.7)
MCV RBC AUTO: 77 FL (ref 79–97)
MONOCYTES # BLD AUTO: 0.5 X10E3/UL (ref 0.1–0.9)
MONOCYTES NFR BLD AUTO: 7 %
MORPHOLOGY BLD-IMP: ABNORMAL
NEUTROPHILS # BLD AUTO: 4.5 X10E3/UL (ref 1.4–7)
NEUTROPHILS NFR BLD AUTO: 72 %
PLATELET # BLD AUTO: 274 X10E3/UL (ref 150–450)
POTASSIUM SERPL-SCNC: 4.5 MMOL/L (ref 3.5–5.2)
RBC # BLD AUTO: 4.27 X10E6/UL (ref 3.77–5.28)
SODIUM SERPL-SCNC: 141 MMOL/L (ref 134–144)
WBC # BLD AUTO: 6.2 X10E3/UL (ref 3.4–10.8)

## 2021-08-23 ENCOUNTER — TELEPHONE (OUTPATIENT)
Dept: FAMILY MEDICINE CLINIC | Age: 51
End: 2021-08-23

## 2021-08-23 NOTE — TELEPHONE ENCOUNTER
----- Message from Joya Fountain MD sent at 8/20/2021  5:20 PM EDT -----  Please schedule follow up in clinic apt with first available provider for follow up on chronic conditions    Thank you,  Julissa Nielsen

## 2021-08-23 NOTE — TELEPHONE ENCOUNTER
[phone]    Spoke with patient. She did not understand the call to schedule any appointment. Said she wants her lab results and she will not come into the office. This is all she wants from Dr. Judy Wang.

## 2021-08-24 NOTE — PROGRESS NOTES
Anemia improving with daily iron, POC hgb a few weeks prior was 8.2  CBC consistent with microcytic anemia reflective of iron deficiency  BMP unremarkable

## 2021-08-24 NOTE — TELEPHONE ENCOUNTER
Called patient and discussed results. Anemia improved on Iron. Has appointment in October with OBGYN specialist. Patient to schedule follow up appt beginning of September with us. Will discuss scheduling of endometrial biopsy at that time.

## 2021-09-13 ENCOUNTER — TELEPHONE (OUTPATIENT)
Dept: FAMILY MEDICINE CLINIC | Age: 51
End: 2021-09-13

## 2021-09-14 ENCOUNTER — TELEPHONE (OUTPATIENT)
Dept: FAMILY MEDICINE CLINIC | Age: 51
End: 2021-09-14

## 2021-09-14 NOTE — TELEPHONE ENCOUNTER
Spoke with patient with regards to her appointment yesterday. She arrived 30- 40 minutes late and was asked if she would like to reschedule, instead she opted to wait until someone would come talk with her. However, patient was not in office when I went to talk with her between patients. Patient reports having called all morning the day of her appt to see if she needed to keep her appointment but never received a call back that morning. The importance of needing a follow up appointment was discussed with patient on initial appt August 6th, when she was instructed to schedule a follow up appt in 1-2 weeks and via phone later, after she told the office previously that she did not want to come into the office. Patient will be transferring care to another practice and is currently requesting her records.

## 2021-09-16 ENCOUNTER — TELEPHONE (OUTPATIENT)
Dept: FAMILY MEDICINE CLINIC | Age: 51
End: 2021-09-16

## 2021-09-16 NOTE — TELEPHONE ENCOUNTER
[phone]  Patient called about record request.    Informed it was received and sent to [employer] to process records. She was informed her [employer] my chart portal was active and she can print off records.

## 2022-03-18 PROBLEM — D75.839 THROMBOCYTOSIS: Status: ACTIVE | Noted: 2019-03-08

## 2022-03-18 PROBLEM — R10.2 PELVIC PAIN: Status: ACTIVE | Noted: 2021-08-06

## 2022-03-19 PROBLEM — D21.9 LEIOMYOMA: Status: ACTIVE | Noted: 2021-08-06

## 2022-03-19 PROBLEM — N93.9 ABNORMAL UTERINE BLEEDING (AUB): Status: ACTIVE | Noted: 2019-03-08

## 2022-03-19 PROBLEM — R31.9 HEMATURIA: Status: ACTIVE | Noted: 2021-08-06

## 2022-03-19 PROBLEM — R30.0 DYSURIA: Status: ACTIVE | Noted: 2021-08-06

## 2022-03-20 PROBLEM — D50.0 IRON DEFICIENCY ANEMIA DUE TO CHRONIC BLOOD LOSS: Status: ACTIVE | Noted: 2019-03-08

## 2023-02-16 ENCOUNTER — OFFICE VISIT (OUTPATIENT)
Dept: FAMILY MEDICINE CLINIC | Age: 53
End: 2023-02-16
Payer: COMMERCIAL

## 2023-02-16 VITALS
WEIGHT: 148.2 LBS | DIASTOLIC BLOOD PRESSURE: 64 MMHG | TEMPERATURE: 99.3 F | HEIGHT: 63 IN | HEART RATE: 71 BPM | SYSTOLIC BLOOD PRESSURE: 110 MMHG | BODY MASS INDEX: 26.26 KG/M2 | RESPIRATION RATE: 16 BRPM | OXYGEN SATURATION: 97 %

## 2023-02-16 DIAGNOSIS — M54.50 MIDLINE LOW BACK PAIN WITHOUT SCIATICA, UNSPECIFIED CHRONICITY: ICD-10-CM
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
## 2023-02-16 RX ORDER — MELOXICAM 7.5 MG/1
7.5 TABLET ORAL
Qty: 30 TABLET | Refills: 0 | Status: SHIPPED | OUTPATIENT
Start: 2023-02-16
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
## 2023-03-08 ENCOUNTER — TELEPHONE (OUTPATIENT)
Dept: FAMILY MEDICINE CLINIC | Age: 53
End: 2023-03-08
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
## 2023-05-11 ENCOUNTER — NURSE TRIAGE (OUTPATIENT)
Dept: OTHER | Facility: CLINIC | Age: 53
End: 2023-05-11

## 2023-05-11 NOTE — TELEPHONE ENCOUNTER
Location of patient: [address] catherine from [facility] with [employer]. Subjective: Caller states \"swelling in toe\"     Current Symptoms: left big toe swelling and numbness. Foot started to then hurt and feeling numb. Now in leg-it feels swollen but doesn't look swollen. Pain in left leg. When pregnant 22 years ago started having issues with legs. On lower left leg there is now a patch of \"broken veins\" and it is painful-warm to the touch at times. On birth control currently and is concerned about possible blood clots. Feels like needles are being placed in heel. Onset: 1 month ago; worsening    Associated Symptoms: NA    Pain Severity: 6/10 in left foot ; sharp, burning, tingling, pins and needles; constant    Temperature: denies     What has been tried: n/a    LMP:  few months  Pregnant: NA    Recommended disposition: See in Office Today    Care advice provided, patient verbalizes understanding; denies any other questions or concerns; instructed to call back for any new or worsening symptoms. Patient/Caller agrees with recommended disposition; writer provided warm transfer to [facility] for appointment scheduling    Attention Provider: Thank you for allowing me to participate in the care of your patient. The patient was connected to triage in response to information provided to the ECC/PSC. Please do not respond through this encounter as the response is not directed to a shared pool.     Reason for Disposition   Patient wants to be seen    Protocols used: Neurologic Deficit-ADULT-[place]

## 2023-05-15 ENCOUNTER — OFFICE VISIT (OUTPATIENT)
Age: 53
End: 2023-05-15
Payer: COMMERCIAL

## 2023-05-15 VITALS — SYSTOLIC BLOOD PRESSURE: 111 MMHG | OXYGEN SATURATION: 98 % | HEART RATE: 63 BPM | DIASTOLIC BLOOD PRESSURE: 60 MMHG

## 2023-05-15 DIAGNOSIS — G62.9 NEUROPATHY: Primary | ICD-10-CM

## 2023-05-15 DIAGNOSIS — D50.0 IRON DEFICIENCY ANEMIA DUE TO CHRONIC BLOOD LOSS: ICD-10-CM

## 2023-05-15 DIAGNOSIS — E55.9 VITAMIN D DEFICIENCY: ICD-10-CM

## 2023-05-15 DIAGNOSIS — M79.605 LEFT LEG PAIN: ICD-10-CM

## 2023-05-15 PROCEDURE — 99213 OFFICE O/P EST LOW 20 MIN: CPT | Performed by: FAMILY MEDICINE

## 2023-05-15 RX ORDER — ACETAMINOPHEN AND CODEINE PHOSPHATE 120; 12 MG/5ML; MG/5ML
1 SOLUTION ORAL DAILY
COMMUNITY
Start: 2023-04-28

## 2023-05-15 NOTE — PROGRESS NOTES
Chief Complaint   Patient presents with    Toe Pain    Leg Pain     1. Have you been to the ER, urgent care clinic since your last visit? Hospitalized since your last visit? No    2. Have you seen or consulted any other health care providers outside of the [address] [place] [address] since your last visit? Include any pap smears or colon screening.  No
I reviewed with the resident the medical history and the resident's findings on the physical examination. I discussed with the resident the patient's diagnosis and concur with the plan.
Protective Sensation: 5 sites tested. 5 sites sensed. Skin integrity: Skin integrity normal.      Comments: 5/5 sites sensed but patient reports diminished sensation. NO pain with palpation of metatarsal heads or plantar arch. Neurological:      Mental Status: She is alert. Sensory: No sensory deficit. Motor: No weakness. Deep Tendon Reflexes: Reflexes abnormal.      Comments: Patellar reflexes diminished bilaterally          Assessment   Ryan Barillas is a 47 y.o. who is here for neuropathy of the lower extremities, much worse on the left but also present on the right. Unclear etiology. There is evidence of L5-S1 DDD however neuropathy appears to progress in a retrograde fashion and patient without back pain. Differential includes diabetes, demyelinating condition, vitamin deficiencies, autoimmune conditions, other. Will start with labs below and refer to neurology for EMG testing. Low suspicion for DVT but given patient is on OCPs and per request will obtain ultrasound. Maria Longoria was seen today for toe pain and leg pain. Diagnoses and all orders for this visit:    Neuropathy  -     Vitamin B12; Future  -     Folate; Future  -     CBC; Future  -     Comprehensive Metabolic Panel; Future  -     RPR; Future  -     TERESA, Direct, w/Reflex; Future  -     Amelia Knight MD, Neurology, [place]  -     Iron; Future  -     Folate  -     Iron  -     TERESA, Direct, w/Reflex  -     RPR  -     Comprehensive Metabolic Panel  -     CBC  -     Vitamin B12    Left leg pain  -     Vascular duplex lower extremity venous left; Future    Iron deficiency anemia due to chronic blood loss  -     Ferritin; Future  -     Ferritin    Vitamin D deficiency  -     Vitamin D 25 Hydroxy;  Future         Follow up pending labs      I discussed this patient with Dr. Missy Richardson (Attending Physician)       Signed By:  Marilynn Drew DO    Family Medicine Resident

## 2023-05-16 LAB
25(OH)D3 SERPL-MCNC: 35.5 NG/ML (ref 30–100)
ALBUMIN SERPL-MCNC: 4.2 G/DL (ref 3.5–5)
ALBUMIN/GLOB SERPL: 1.1 (ref 1.1–2.2)
ALP SERPL-CCNC: 92 U/L (ref 45–117)
ALT SERPL-CCNC: 29 U/L (ref 12–78)
ANION GAP SERPL CALC-SCNC: 5 MMOL/L (ref 5–15)
AST SERPL-CCNC: 22 U/L (ref 15–37)
BILIRUB SERPL-MCNC: 0.7 MG/DL (ref 0.2–1)
BUN SERPL-MCNC: 18 MG/DL (ref 6–20)
BUN/CREAT SERPL: 23 (ref 12–20)
CALCIUM SERPL-MCNC: 9.4 MG/DL (ref 8.5–10.1)
CHLORIDE SERPL-SCNC: 109 MMOL/L (ref 97–108)
CO2 SERPL-SCNC: 28 MMOL/L (ref 21–32)
CREAT SERPL-MCNC: 0.77 MG/DL (ref 0.55–1.02)
ERYTHROCYTE [DISTWIDTH] IN BLOOD BY AUTOMATED COUNT: 18.6 % (ref 11.5–14.5)
FERRITIN SERPL-MCNC: 6 NG/ML (ref 8–252)
FOLATE SERPL-MCNC: 19 NG/ML (ref 5–21)
GLOBULIN SER CALC-MCNC: 3.8 G/DL (ref 2–4)
GLUCOSE SERPL-MCNC: 88 MG/DL (ref 65–100)
HCT VFR BLD AUTO: 39.4 % (ref 35–47)
HGB BLD-MCNC: 11.6 G/DL (ref 11.5–16)
IRON SERPL-MCNC: 23 UG/DL (ref 35–150)
MCH RBC QN AUTO: 25.1 PG (ref 26–34)
MCHC RBC AUTO-ENTMCNC: 29.4 G/DL (ref 30–36.5)
MCV RBC AUTO: 85.1 FL (ref 80–99)
NRBC # BLD: 0 K/UL (ref 0–0.01)
NRBC BLD-RTO: 0 PER 100 WBC
PLATELET # BLD AUTO: 259 K/UL (ref 150–400)
POTASSIUM SERPL-SCNC: 3.9 MMOL/L (ref 3.5–5.1)
PROT SERPL-MCNC: 8 G/DL (ref 6.4–8.2)
RBC # BLD AUTO: 4.63 M/UL (ref 3.8–5.2)
RPR SER QL: NONREACTIVE
SODIUM SERPL-SCNC: 142 MMOL/L (ref 136–145)
VIT B12 SERPL-MCNC: 502 PG/ML (ref 193–986)
WBC # BLD AUTO: 4.6 K/UL (ref 3.6–11)

## 2023-05-17 LAB — ANA SER QL: NEGATIVE

## 2023-05-18 ENCOUNTER — PATIENT MESSAGE (OUTPATIENT)
Age: 53
End: 2023-05-18

## 2023-05-18 DIAGNOSIS — D50.0 IRON DEFICIENCY ANEMIA DUE TO CHRONIC BLOOD LOSS: Primary | ICD-10-CM

## 2023-05-22 ENCOUNTER — HOSPITAL ENCOUNTER (OUTPATIENT)
Facility: HOSPITAL | Age: 53
Discharge: HOME | End: 2023-05-24
Payer: COMMERCIAL

## 2023-05-22 DIAGNOSIS — M79.605 LEFT LEG PAIN: ICD-10-CM

## 2023-05-22 PROCEDURE — 93971 EXTREMITY STUDY: CPT

## 2023-05-22 PROCEDURE — 93971 EXTREMITY STUDY: CPT | Performed by: SURGERY

## 2025-03-13 ENCOUNTER — OFFICE VISIT (OUTPATIENT)
Age: 55
End: 2025-03-13

## 2025-03-13 VITALS
SYSTOLIC BLOOD PRESSURE: 104 MMHG | RESPIRATION RATE: 18 BRPM | WEIGHT: 149 LBS | DIASTOLIC BLOOD PRESSURE: 67 MMHG | OXYGEN SATURATION: 98 % | HEIGHT: 63 IN | TEMPERATURE: 98.5 F | HEART RATE: 62 BPM | BODY MASS INDEX: 26.4 KG/M2

## 2025-03-13 DIAGNOSIS — Z12.31 ENCOUNTER FOR SCREENING MAMMOGRAM FOR BREAST CANCER: ICD-10-CM

## 2025-03-13 DIAGNOSIS — Z12.11 SCREENING FOR COLON CANCER: ICD-10-CM

## 2025-03-13 DIAGNOSIS — Z00.00 ENCOUNTER FOR ANNUAL PHYSICAL EXAM: Primary | ICD-10-CM

## 2025-03-13 SDOH — ECONOMIC STABILITY: FOOD INSECURITY: WITHIN THE PAST 12 MONTHS, THE FOOD YOU BOUGHT JUST DIDN'T LAST AND YOU DIDN'T HAVE MONEY TO GET MORE.: NEVER TRUE

## 2025-03-13 SDOH — ECONOMIC STABILITY: FOOD INSECURITY: WITHIN THE PAST 12 MONTHS, YOU WORRIED THAT YOUR FOOD WOULD RUN OUT BEFORE YOU GOT MONEY TO BUY MORE.: NEVER TRUE

## 2025-03-13 ASSESSMENT — PATIENT HEALTH QUESTIONNAIRE - PHQ9
SUM OF ALL RESPONSES TO PHQ QUESTIONS 1-9: 0
2. FEELING DOWN, DEPRESSED OR HOPELESS: NOT AT ALL
SUM OF ALL RESPONSES TO PHQ QUESTIONS 1-9: 0
1. LITTLE INTEREST OR PLEASURE IN DOING THINGS: NOT AT ALL

## 2025-03-13 NOTE — PROGRESS NOTES
Patient has been identified by name and .    Chief Complaint   Patient presents with    Fibroids       There were no vitals filed for this visit.     \"Have you been to the ER, urgent care clinic since your last visit?  Hospitalized since your last visit?\"    NO    “Have you seen or consulted any other health care providers outside of [facility] since your last visit?”    Yes, [facility] Coats    “Have you had a colorectal cancer screening such as a colonoscopy/FIT/Cologuard?    NO    No colonoscopy on file  No cologuard on file  No FIT/FOBT on file   No flexible sigmoidoscopy on file        Have you had a mammogram?”   NO    No breast cancer screening on file      “Have you had a pap smear?”    NO    Date of last Cervical Cancer screen (HPV or PAP): 2019

## 2025-03-13 NOTE — PROGRESS NOTES
[facility]    Subjective:   Eliz Garvin is an 54 y.o. female who presents for annual physical exam.    Concerns today: Neck stiffness and pain, chronic        Hx of fibroids, menopausal for a year, no bleeding or spotting since. Mother is undergoing surgery for a large abdominal mass.     Diet: balanced  Exercise: occasionally  Tobacco use: no  Alcohol use: no    Health Maintenance   Topic Date Due    Hepatitis B vaccine (1 of 3 - 19+ 3-dose series) Never done    DTaP/Tdap/Td vaccine (1 - Tdap) Never done    Breast cancer screen  Never done    Colorectal Cancer Screen  Never done    Shingles vaccine (1 of 2) Never done    Pneumococcal 50+ years Vaccine (1 of 1 - PCV) Never done    Lipids  07/25/2024    Cervical cancer screen  07/25/2024    Flu vaccine (1) Never done    COVID-19 Vaccine (1 - 2024-25 season) Never done    Depression Screen  03/13/2026    Hepatitis C screen  Completed    HIV screen  Completed    Hepatitis A vaccine  Aged Out    Hib vaccine  Aged Out    Polio vaccine  Aged Out    Meningococcal (ACWY) vaccine  Aged Out    Meningococcal B vaccine  Aged Out       Immunizations, reviewed:   Immunization History   Administered Date(s) Administered    PPD Test 06/19/2013     Declines vaccines at this time    Health Maintenance, reviewed:  Colonoscopy: declines, will prefer FIT test  Cheko also screen for diabetes, HLD, refer to mammogram  Patient declines pelvic exam as she had it done by her GYN last year    Allergies, reviewed:   Allergies   Allergen Reactions    Tioconazole Itching       Medications, reviewed:  Current Outpatient Medications   Medication Sig    Cholecalciferol 50 MCG (2000 UT) TABS Take by mouth    norethindrone (MICRONOR) 0.35 MG tablet Take 1 tablet by mouth daily (Patient not taking: Reported on 3/13/2025)    ferrous sulfate (IRON 325) 325 (65 Fe) MG tablet Take by mouth 2 times daily (Patient not taking: Reported on

## 2025-03-14 LAB
25(OH)D3+25(OH)D2 SERPL-MCNC: 31.2 NG/ML (ref 30–100)
BASOPHILS # BLD AUTO: 0.1 X10E3/UL (ref 0–0.2)
BASOPHILS NFR BLD AUTO: 1 %
CHOLEST SERPL-MCNC: 181 MG/DL (ref 100–199)
EOSINOPHIL # BLD AUTO: 0.2 X10E3/UL (ref 0–0.4)
EOSINOPHIL NFR BLD AUTO: 6 %
ERYTHROCYTE [DISTWIDTH] IN BLOOD BY AUTOMATED COUNT: 13 % (ref 11.7–15.4)
HBA1C MFR BLD: 5.9 % (ref 4.8–5.6)
HCT VFR BLD AUTO: 44.8 % (ref 34–46.6)
HDLC SERPL-MCNC: 58 MG/DL
HGB BLD-MCNC: 14.6 G/DL (ref 11.1–15.9)
IMM GRANULOCYTES # BLD AUTO: 0 X10E3/UL (ref 0–0.1)
IMM GRANULOCYTES NFR BLD AUTO: 1 %
LDLC SERPL CALC-MCNC: 105 MG/DL (ref 0–99)
LYMPHOCYTES # BLD AUTO: 1.9 X10E3/UL (ref 0.7–3.1)
LYMPHOCYTES NFR BLD AUTO: 48 %
MCH RBC QN AUTO: 30.3 PG (ref 26.6–33)
MCHC RBC AUTO-ENTMCNC: 32.6 G/DL (ref 31.5–35.7)
MCV RBC AUTO: 93 FL (ref 79–97)
MONOCYTES # BLD AUTO: 0.3 X10E3/UL (ref 0.1–0.9)
MONOCYTES NFR BLD AUTO: 8 %
NEUTROPHILS # BLD AUTO: 1.4 X10E3/UL (ref 1.4–7)
NEUTROPHILS NFR BLD AUTO: 36 %
PLATELET # BLD AUTO: 277 X10E3/UL (ref 150–450)
RBC # BLD AUTO: 4.82 X10E6/UL (ref 3.77–5.28)
TRIGL SERPL-MCNC: 99 MG/DL (ref 0–149)
TSH SERPL DL<=0.005 MIU/L-ACNC: 1.76 UIU/ML (ref 0.45–4.5)
VLDLC SERPL CALC-MCNC: 18 MG/DL (ref 5–40)
WBC # BLD AUTO: 3.9 X10E3/UL (ref 3.4–10.8)

## 2025-03-15 LAB
ALBUMIN SERPL-MCNC: 4.8 G/DL (ref 3.8–4.9)
ALP SERPL-CCNC: 139 IU/L (ref 44–121)
ALT SERPL-CCNC: 29 IU/L (ref 0–32)
AST SERPL-CCNC: 28 IU/L (ref 0–40)
BILIRUB SERPL-MCNC: 0.9 MG/DL (ref 0–1.2)
BUN SERPL-MCNC: 8 MG/DL (ref 6–24)
BUN/CREAT SERPL: 10 (ref 9–23)
CALCIUM SERPL-MCNC: 9.8 MG/DL (ref 8.7–10.2)
CHLORIDE SERPL-SCNC: 101 MMOL/L (ref 96–106)
CO2 SERPL-SCNC: 22 MMOL/L (ref 20–29)
CREAT SERPL-MCNC: 0.77 MG/DL (ref 0.57–1)
EGFRCR SERPLBLD CKD-EPI 2021: 92 ML/MIN/1.73
GLOBULIN SER CALC-MCNC: 2.8 G/DL (ref 1.5–4.5)
GLUCOSE SERPL-MCNC: 81 MG/DL (ref 70–99)
IMP & REVIEW OF LAB RESULTS: NORMAL
POTASSIUM SERPL-SCNC: 4.2 MMOL/L (ref 3.5–5.2)
PROT SERPL-MCNC: 7.6 G/DL (ref 6–8.5)
SODIUM SERPL-SCNC: 139 MMOL/L (ref 134–144)

## 2025-03-16 ENCOUNTER — RESULTS FOLLOW-UP (OUTPATIENT)
Age: 55
End: 2025-03-16

## 2025-04-01 LAB — NONINV COLON CA DNA+OCC BLD SCRN STL QL: NEGATIVE
